# Patient Record
Sex: FEMALE | Race: BLACK OR AFRICAN AMERICAN | NOT HISPANIC OR LATINO | URBAN - METROPOLITAN AREA
[De-identification: names, ages, dates, MRNs, and addresses within clinical notes are randomized per-mention and may not be internally consistent; named-entity substitution may affect disease eponyms.]

---
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
## 2024-10-14 ENCOUNTER — OFFICE VISIT (OUTPATIENT)
Dept: PRIMARY CARE CLINIC | Facility: CLINIC | Age: 24
End: 2024-10-14
Payer: COMMERCIAL

## 2024-10-14 VITALS
HEIGHT: 70 IN | HEART RATE: 88 BPM | DIASTOLIC BLOOD PRESSURE: 80 MMHG | OXYGEN SATURATION: 99 % | WEIGHT: 228 LBS | RESPIRATION RATE: 17 BRPM | BODY MASS INDEX: 32.64 KG/M2 | SYSTOLIC BLOOD PRESSURE: 121 MMHG

## 2024-10-14 DIAGNOSIS — D64.9 ANEMIA, UNSPECIFIED TYPE: ICD-10-CM

## 2024-10-14 DIAGNOSIS — Z00.00 ENCOUNTER FOR MEDICAL EXAMINATION TO ESTABLISH CARE: Primary | ICD-10-CM

## 2024-10-14 DIAGNOSIS — E66.9 OBESITY, UNSPECIFIED CLASS, UNSPECIFIED OBESITY TYPE, UNSPECIFIED WHETHER SERIOUS COMORBIDITY PRESENT: ICD-10-CM

## 2024-10-14 DIAGNOSIS — L73.2 HYDRADENITIS: ICD-10-CM

## 2024-10-14 PROCEDURE — 3008F BODY MASS INDEX DOCD: CPT | Mod: CPTII,,, | Performed by: FAMILY MEDICINE

## 2024-10-14 PROCEDURE — 99204 OFFICE O/P NEW MOD 45 MIN: CPT | Mod: ,,, | Performed by: FAMILY MEDICINE

## 2024-10-14 PROCEDURE — 3079F DIAST BP 80-89 MM HG: CPT | Mod: CPTII,,, | Performed by: FAMILY MEDICINE

## 2024-10-14 PROCEDURE — 1159F MED LIST DOCD IN RCRD: CPT | Mod: CPTII,,, | Performed by: FAMILY MEDICINE

## 2024-10-14 PROCEDURE — 3074F SYST BP LT 130 MM HG: CPT | Mod: CPTII,,, | Performed by: FAMILY MEDICINE

## 2024-10-14 PROCEDURE — 1160F RVW MEDS BY RX/DR IN RCRD: CPT | Mod: CPTII,,, | Performed by: FAMILY MEDICINE

## 2024-10-14 RX ORDER — CLINDAMYCIN PHOSPHATE 10 MG/G
GEL TOPICAL 2 TIMES DAILY
Qty: 30 G | Refills: 2 | Status: SHIPPED | OUTPATIENT
Start: 2024-10-14 | End: 2024-10-28

## 2024-10-14 NOTE — PROGRESS NOTES
"  Family Medicine    Patient ID: [id number]     Chief Complaint: Establish Care      HPI:     Maria Teresa Cristobal is a 24 y.o. female here today to establish care.    Hidradenitis flair ups on and off.  Last abx clinda 7/2024.  No current breakout concerns.  Does smoke occ marijuana.   Occ temporal HA L side, improves with Tylenol.  Using screens a lot at work and trying blue glasses and eye rest.   Due for pap.    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      Past Medical History:   Diagnosis Date    Hydradenitis         Past Surgical History:   Procedure Laterality Date    SURGICAL REMOVAL OF ABSCESS          Social History     Tobacco Use    Smoking status: Never    Smokeless tobacco: Never   Substance and Sexual Activity    Alcohol use: Never    Drug use: Not Currently     Types: Marijuana     Comment: last time using was 2 weeks ago    Sexual activity: Yes     Partners: Male     Birth control/protection: None        Current Outpatient Medications   Medication Instructions    clindamycin phosphate 1% (CLINDAGEL) 1 % gel Topical (Top), 2 times daily       Review of patient's allergies indicates:  No Known Allergies     Patient Care Team:  Greer Irving MD as PCP - General (Family Medicine)     Subjective:     Review of Systems    12 point review of systems conducted, negative except as stated in the history of present illness. See HPI for details.    Objective:     Visit Vitals  /80   Pulse 88   Resp 17   Ht 5' 10" (1.778 m)   Wt 103.4 kg (228 lb)   LMP 10/02/2024   SpO2 99%   BMI 32.71 kg/m²       Physical Exam  Vitals and nursing note reviewed.   Constitutional:       Appearance: Normal appearance. She is obese.   HENT:      Mouth/Throat:      Mouth: Mucous membranes are moist.   Cardiovascular:      Rate and Rhythm: Normal rate and regular rhythm.   Pulmonary:      Effort: Pulmonary effort is normal.      Breath sounds: Normal breath sounds.   Skin:     Comments: L axilla 1cm swollen area " "with scar tissue surrounding and 1/2 cm slightly erythematous area.    Neurological:      General: No focal deficit present.      Mental Status: She is alert.   Psychiatric:         Mood and Affect: Mood normal.         Labs Reviewed:     Chemistry:  Lab Results   Component Value Date     04/29/2023    K 4.4 04/29/2023    BUN 10 04/29/2023    CREATININE 0.72 04/29/2023    CALCIUM 9.0 04/29/2023        No results found for: "HGBA1C", "MICROALBCREA"     Hematology:  No results found for: "WBC", "HGB", "HCT", "PLT"    Lipid Panel:  No results found for: "CHOL", "HDL", "LDL", "TRIG", "TOTALCHOLEST"     Urine:  No results found for: "COLORUA", "APPEARANCEUA", "SGUA", "PHUA", "PROTEINUA", "GLUCOSEUA", "KETONESUA", "BLOODUA", "NITRITESUA", "LEUKOCYTESUR", "RBCUA", "WBCUA", "BACTERIA", "SQEPUA", "HYALINECASTS", "CREATRANDUR", "PROTEINURINE", "UPROTCREA"     Assessment:       ICD-10-CM ICD-9-CM   1. Encounter for medical examination to establish care  Z00.00 V70.9   2. Anemia, unspecified type  D64.9 285.9   3. Hydradenitis  L73.2 705.83   4. Obesity, unspecified class, unspecified obesity type, unspecified whether serious comorbidity present  E66.9 278.00        Plan:     1. Encounter for medical examination to establish care  Overview:  Labs ordered and ill be reviewed at wellness.  Due for pap.     Orders:  -     Iron and TIBC; Future; Expected date: 10/14/2024  -     Ferritin; Future; Expected date: 10/14/2024  -     Reticulocytes; Future; Expected date: 10/14/2024  -     CBC Auto Differential; Future; Expected date: 10/14/2024  -     Lipid Panel; Future; Expected date: 10/14/2024  -     TSH; Future; Expected date: 10/14/2024  -     Hemoglobin A1C; Future; Expected date: 10/14/2024  -     Folate; Future; Expected date: 10/14/2024  -     Vitamin B12; Future; Expected date: 10/14/2024  -     HIV 1/2 Ag/Ab (4th Gen); Future; Expected date: 10/14/2024  -     Hepatitis C Antibody; Future; Expected date: 10/14/2024    2. " Anemia, unspecified type  Overview:  Iron studies ordered.  Start iron supplement once every 3 days to avoid constipation.     Orders:  -     Iron and TIBC; Future; Expected date: 10/14/2024  -     Ferritin; Future; Expected date: 10/14/2024  -     Reticulocytes; Future; Expected date: 10/14/2024  -     CBC Auto Differential; Future; Expected date: 10/14/2024  -     Folate; Future; Expected date: 10/14/2024  -     Vitamin B12; Future; Expected date: 10/14/2024    3. Hydradenitis  Overview:  Stable.  Clindamycin gel to open sores. Continue to avoid deodorant.  Clean daily with washcloth.      Orders:  -     clindamycin phosphate 1% (CLINDAGEL) 1 % gel; Apply topically 2 (two) times daily. for 14 days  Dispense: 30 g; Refill: 2    4. Obesity, unspecified class, unspecified obesity type, unspecified whether serious comorbidity present  Overview:  -Increase exercise: Start with 10 minutes daily and build up to 60-90 minutes/day with moderate activity  -Reduce caloric intake to 1500 kcal/day  -Avoid soda, simple sugars, excessive rice, potatoes or bread. Limit fast foods and fried foods.    Better control can help reduce flair ups of Hidradenitis.            Follow up in about 3 months (around 1/14/2025) for With labwork prior to visit, Annual Wellness with pap. In addition to their scheduled follow up, the patient has also been instructed to follow up on as needed basis.     Future Appointments   Date Time Provider Department Center   1/16/2025  9:00 AM Greer Irving MD [facility] ISABELLA Irving MD

## 2025-02-26 ENCOUNTER — PATIENT MESSAGE (OUTPATIENT)
Facility: CLINIC | Age: 25
End: 2025-02-26
Payer: COMMERCIAL

## 2025-02-26 ENCOUNTER — PATIENT OUTREACH (OUTPATIENT)
Facility: CLINIC | Age: 25
End: 2025-02-26
Payer: COMMERCIAL

## 2025-02-26 DIAGNOSIS — Z13.220 SCREENING FOR CHOLESTEROL LEVEL: Primary | ICD-10-CM

## 2025-02-26 NOTE — PROGRESS NOTES
Health Maintenance Topic(s) Outreach Outcomes & Actions Taken:    Lab(s) - Outreach Outcomes & Actions Taken  : Added Overdue Lipid to existing Labs.    Cervical Cancer Screening - Outreach Outcomes & Actions Taken  : PCP to perform . Chlamydia screening due.    Lab(s) - Outreach Outcomes & Actions Taken  : HIV and Hep C overdue.       Additional Notes:    Portal message sent.

## 2025-04-02 ENCOUNTER — OFFICE VISIT (OUTPATIENT)
Dept: URGENT CARE | Facility: CLINIC | Age: 25
End: 2025-04-02
Payer: COMMERCIAL

## 2025-04-02 VITALS
BODY MASS INDEX: 34.36 KG/M2 | RESPIRATION RATE: 18 BRPM | HEIGHT: 70 IN | DIASTOLIC BLOOD PRESSURE: 70 MMHG | TEMPERATURE: 98 F | OXYGEN SATURATION: 99 % | SYSTOLIC BLOOD PRESSURE: 105 MMHG | HEART RATE: 91 BPM | WEIGHT: 240 LBS

## 2025-04-02 DIAGNOSIS — L73.2 HIDRADENITIS SUPPURATIVA OF RIGHT AXILLA: Primary | ICD-10-CM

## 2025-04-02 PROCEDURE — 99214 OFFICE O/P EST MOD 30 MIN: CPT | Mod: PBBFAC | Performed by: NURSE PRACTITIONER

## 2025-04-02 RX ORDER — IBUPROFEN 800 MG/1
800 TABLET ORAL 3 TIMES DAILY
Qty: 15 TABLET | Refills: 0 | Status: SHIPPED | OUTPATIENT
Start: 2025-04-02 | End: 2025-04-07

## 2025-04-02 RX ORDER — DOXYCYCLINE HYCLATE 100 MG
100 TABLET ORAL 2 TIMES DAILY
Qty: 14 TABLET | Refills: 0 | Status: SHIPPED | OUTPATIENT
Start: 2025-04-02 | End: 2025-04-09

## 2025-04-02 RX ORDER — CLINDAMYCIN PHOSPHATE 11.9 MG/ML
SOLUTION TOPICAL 2 TIMES DAILY
Qty: 60 ML | Refills: 0 | Status: SHIPPED | OUTPATIENT
Start: 2025-04-02

## 2025-04-02 NOTE — PROGRESS NOTES
"Subjective:      Patient ID: Maria Teresa Cristobal is a 24 y.o. female.    Vitals:  height is 5' 10" (1.778 m) and weight is 108.9 kg (240 lb). Her temperature is 98.4 °F (36.9 °C). Her blood pressure is 105/70 and her pulse is 91. Her respiration is 18 and oxygen saturation is 99%.     Chief Complaint: Abscess (Pt c/o RT underarm boil x 4 days )    Abscess  Known hx of HS.  Patient denies any recent antibiotic treatment.  Pt denies fever, shortness for breath or chest pain, dizziness, weakness, stomach pain, nausea or vomiting, dysuria.    Skin:  Positive for erythema and abscess.      Objective:     Physical Exam   Constitutional: She is oriented to person, place, and time. She appears well-developed. She is cooperative.  Non-toxic appearance. She does not appear ill. No distress. morbidly obeseawake  HENT:   Head: Atraumatic.   Nose: No purulent discharge. Right sinus exhibits no maxillary sinus tenderness and no frontal sinus tenderness. Left sinus exhibits no maxillary sinus tenderness and no frontal sinus tenderness.   Mouth/Throat: Uvula is midline.   Eyes: Right eye exhibits no discharge. Left eye exhibits no discharge. Extraocular movement intact   Neck: Neck supple. No neck rigidity present.   Cardiovascular: Regular rhythm and normal pulses.   Pulmonary/Chest: Effort normal and breath sounds normal. No respiratory distress. She has no wheezes. She has no rhonchi. She has no rales.   Abdominal: Normal appearance.   Lymphadenopathy:     She has no cervical adenopathy.   Neurological: no focal deficit. She is alert and oriented to person, place, and time.   Skin: Skin is warm, dry, not diaphoretic and rash. Capillary refill takes less than 2 seconds. erythema         Comments: Diffuse involvement of the axilla with inflamed nodules and an open comedo with skin tunnels to right axilla. Warm and has moderate surrounding cellulitis. No areas of induration, appears to be draining and secreting fluids   Psychiatric: " Her behavior is normal. Mood, judgment and thought content normal.   Nursing note and vitals reviewed.      Assessment:     1. Hidradenitis suppurativa of right axilla        Plan:   ER precautions given and discussed  Start antibiotics today.  Apply warm compress, You will do this approximately 3x/day for the next 3-5 days.  Do not put a needle or any sharp object into your skin, the medication will heal it.   Throw away razors/waxing materials used in the last 3 days.  Do not shave the area again until infection fully healed.   Do not stay in sweaty/wet clothing, change promptly.  Tylenol/Motrin as needed for pain. If you get fever, or condition worsens go to ER for further evaluation  Hidradenitis suppurativa of right axilla  -     doxycycline (VIBRA-TABS) 100 MG tablet; Take 1 tablet (100 mg total) by mouth 2 (two) times daily. for 7 days  Dispense: 14 tablet; Refill: 0  -     clindamycin (CLEOCIN T) 1 % external solution; Apply topically 2 (two) times daily.  Dispense: 60 mL; Refill: 0  -     ibuprofen (ADVIL,MOTRIN) 800 MG tablet; Take 1 tablet (800 mg total) by mouth 3 (three) times daily. for 5 days  Dispense: 15 tablet; Refill: 0

## 2025-04-02 NOTE — PATIENT INSTRUCTIONS
Return to [facility] if your infection or symptoms get worse after starting this antibiotic today. Take it as prescribed, do not stop taking it early.  Please read educational material in this packet regarding your condition  Apply warm compress, You will do this approximately 3x/day for the next 3-5 days.  Do not put a needle or any sharp object into your skin, the medication will heal it.   Throw away razors/waxing materials used in the last 3 days.  Do not shave the area again until infection fully healed.   Do not stay in sweaty/wet clothing, change promptly.  Tylenol/Motrin as needed for pain. If you get fever, or condition worsens go to ER for further evaluation